# Patient Record
Sex: MALE | Race: WHITE | URBAN - NONMETROPOLITAN AREA
[De-identification: names, ages, dates, MRNs, and addresses within clinical notes are randomized per-mention and may not be internally consistent; named-entity substitution may affect disease eponyms.]

---

## 2020-10-30 ENCOUNTER — IMPORTED ENCOUNTER (OUTPATIENT)
Dept: URBAN - NONMETROPOLITAN AREA CLINIC 1 | Facility: CLINIC | Age: 75
End: 2020-10-30

## 2020-10-30 PROBLEM — H33.032: Noted: 2020-10-30

## 2020-10-30 PROCEDURE — 99203 OFFICE O/P NEW LOW 30 MIN: CPT

## 2020-10-30 NOTE — PATIENT DISCUSSION
Retinal Detachment OS 2* Tear (macula off)-  discussed findings w/patient-  refer to [facility] -  continue to monitor as per [facility]

## 2022-04-15 ASSESSMENT — TONOMETRY
OD_IOP_MMHG: 14
OS_IOP_MMHG: 14

## 2022-04-15 ASSESSMENT — VISUAL ACUITY
OD_CC: 20/70
OS_CC: 20/70
OD_PH: 20/40-